# Patient Record
Sex: FEMALE | Race: WHITE
[De-identification: names, ages, dates, MRNs, and addresses within clinical notes are randomized per-mention and may not be internally consistent; named-entity substitution may affect disease eponyms.]

---

## 2018-01-03 ENCOUNTER — HOSPITAL ENCOUNTER (OUTPATIENT)
Dept: HOSPITAL 47 - RADXRMAIN | Age: 33
Discharge: HOME | End: 2018-01-03
Attending: PHYSICIAN ASSISTANT
Payer: COMMERCIAL

## 2018-01-03 DIAGNOSIS — M54.6: Primary | ICD-10-CM

## 2018-01-03 PROCEDURE — 71046 X-RAY EXAM CHEST 2 VIEWS: CPT
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
## 2021-10-02 ENCOUNTER — HOSPITAL ENCOUNTER (EMERGENCY)
Dept: HOSPITAL 47 - EC | Age: 36
Discharge: HOME | End: 2021-10-02
Payer: COMMERCIAL

## 2021-10-02 VITALS — RESPIRATION RATE: 20 BRPM | TEMPERATURE: 98 F

## 2021-10-02 VITALS — HEART RATE: 66 BPM | DIASTOLIC BLOOD PRESSURE: 64 MMHG | SYSTOLIC BLOOD PRESSURE: 94 MMHG

## 2021-10-02 DIAGNOSIS — J40: Primary | ICD-10-CM

## 2021-10-02 DIAGNOSIS — Z79.1: ICD-10-CM

## 2021-10-02 DIAGNOSIS — Z88.0: ICD-10-CM

## 2021-10-02 DIAGNOSIS — Z79.899: ICD-10-CM

## 2021-10-02 DIAGNOSIS — F31.9: ICD-10-CM

## 2021-10-02 DIAGNOSIS — F12.90: ICD-10-CM

## 2021-10-02 DIAGNOSIS — Z87.891: ICD-10-CM

## 2021-10-02 LAB
ALBUMIN SERPL-MCNC: 3.7 G/DL (ref 3.5–5)
ALP SERPL-CCNC: 58 U/L (ref 38–126)
ALT SERPL-CCNC: 9 U/L (ref 4–34)
ANION GAP SERPL CALC-SCNC: 6 MMOL/L
AST SERPL-CCNC: 18 U/L (ref 14–36)
BASOPHILS # BLD AUTO: 0.1 K/UL (ref 0–0.2)
BASOPHILS NFR BLD AUTO: 1 %
BUN SERPL-SCNC: 11 MG/DL (ref 7–17)
CALCIUM SPEC-MCNC: 9.1 MG/DL (ref 8.4–10.2)
CHLORIDE SERPL-SCNC: 103 MMOL/L (ref 98–107)
CO2 SERPL-SCNC: 29 MMOL/L (ref 22–30)
EOSINOPHIL # BLD AUTO: 0.3 K/UL (ref 0–0.7)
EOSINOPHIL NFR BLD AUTO: 3 %
ERYTHROCYTE [DISTWIDTH] IN BLOOD BY AUTOMATED COUNT: 4.37 M/UL (ref 3.8–5.4)
ERYTHROCYTE [DISTWIDTH] IN BLOOD: 12.3 % (ref 11.5–15.5)
GLUCOSE SERPL-MCNC: 76 MG/DL (ref 74–99)
HCT VFR BLD AUTO: 41.2 % (ref 34–46)
HGB BLD-MCNC: 13.5 GM/DL (ref 11.4–16)
LYMPHOCYTES # SPEC AUTO: 1.1 K/UL (ref 1–4.8)
LYMPHOCYTES NFR SPEC AUTO: 12 %
MCH RBC QN AUTO: 30.8 PG (ref 25–35)
MCHC RBC AUTO-ENTMCNC: 32.7 G/DL (ref 31–37)
MCV RBC AUTO: 94.3 FL (ref 80–100)
MONOCYTES # BLD AUTO: 0.6 K/UL (ref 0–1)
MONOCYTES NFR BLD AUTO: 6 %
NEUTROPHILS # BLD AUTO: 6.7 K/UL (ref 1.3–7.7)
NEUTROPHILS NFR BLD AUTO: 77 %
PLATELET # BLD AUTO: 292 K/UL (ref 150–450)
POTASSIUM SERPL-SCNC: 3.9 MMOL/L (ref 3.5–5.1)
PROT SERPL-MCNC: 6.9 G/DL (ref 6.3–8.2)
SODIUM SERPL-SCNC: 138 MMOL/L (ref 137–145)
WBC # BLD AUTO: 8.8 K/UL (ref 3.8–10.6)

## 2021-10-02 PROCEDURE — 96360 HYDRATION IV INFUSION INIT: CPT

## 2021-10-02 PROCEDURE — 99283 EMERGENCY DEPT VISIT LOW MDM: CPT

## 2021-10-02 PROCEDURE — 71046 X-RAY EXAM CHEST 2 VIEWS: CPT

## 2021-10-02 PROCEDURE — 36415 COLL VENOUS BLD VENIPUNCTURE: CPT

## 2021-10-02 PROCEDURE — 87635 SARS-COV-2 COVID-19 AMP PRB: CPT

## 2021-10-02 PROCEDURE — 85025 COMPLETE CBC W/AUTO DIFF WBC: CPT

## 2021-10-02 PROCEDURE — 80053 COMPREHEN METABOLIC PANEL: CPT

## 2021-10-02 NOTE — ED
URI HPI





- General


Chief Complaint: Upper Respiratory Infection


Stated Complaint: revisit - abd pain


Time Seen by Provider: 10/02/21 09:10


Source: patient, RN notes reviewed


Mode of arrival: ambulatory


Limitations: no limitations





- History of Present Illness


Initial Comments: 





Patient is a 36-year-old female presents to emergency room complaining of upper 

respiratory tract symptoms for the past 2 weeks.  She notes that she has a mild 

intermittent cough that does cause mild rib pain.  She notes that she's been 

coughing up a yellow green phlegm/sputum.  She'll that she's been to the medics 

breast several times and tested negative for Covid twice in the past 2 weeks.  

She notes that she does not have a history of pneumonia, asthma.  Patient states

that she rarely gets sick.  She denied any chest pain headache nausea vomiting 

diarrhea constipation fever fatigue chills.





- Related Data


                                Home Medications











 Medication  Instructions  Recorded  Confirmed


 


lamoTRIgine [LaMICtal] 100 mg PO DAILY 04/18/16 04/18/16








                                  Previous Rx's











 Medication  Instructions  Recorded


 


Acetaminophen-Codeine 300-30mg 1 each PO Q6H PRN #10 tablet 04/18/16





[Tylenol #3]  


 


Clindamycin HCl 300 mg PO QID #40 cap 04/18/16


 


Ibuprofen [Motrin] 800 mg PO Q8HR PRN #30 tab 04/18/16


 


Levofloxacin [Levaquin] 500 mg PO DAILY #10 tab 10/02/21











                                    Allergies











Allergy/AdvReac Type Severity Reaction Status Date / Time


 


Penicillins Allergy  Itching Verified 10/02/21 09:09














Review of Systems


ROS Statement: 


Those systems with pertinent positive or pertinent negative responses have been 

documented in the HPI.





ROS Other: All systems not noted in ROS Statement are negative.





Past Medical History


Past Medical History: No Reported History


Additional Past Medical History / Comment(s): anemia


History of Any Multi-Drug Resistant Organisms: None Reported


Past Surgical History: Tubal Ligation


Past Psychological History: Bipolar


Smoking Status: Former smoker


Past Alcohol Use History: None Reported


Past Drug Use History: Marijuana





General Exam


Limitations: no limitations


General appearance: alert, in no apparent distress


Head exam: Present: atraumatic, normocephalic, normal inspection


Eye exam: Present: normal appearance, PERRL, EOMI.  Absent: scleral icterus, 

conjunctival injection, periorbital swelling


ENT exam: Present: normal exam, mucous membranes moist


Neck exam: Present: normal inspection


Respiratory exam: Present: normal lung sounds bilaterally, wheezes (Bilateral 

lower lobes.).  Absent: respiratory distress, rales, rhonchi, stridor


Cardiovascular Exam: Present: regular rate, normal rhythm, normal heart sounds. 

 Absent: systolic murmur, diastolic murmur, rubs, gallop, clicks


Extremities exam: Present: normal inspection, full ROM, normal capillary refill.

  Absent: tenderness, pedal edema, joint swelling, calf tenderness


Neurological exam: Present: alert, oriented X3


Psychiatric exam: Present: normal affect, normal mood


Skin exam: Present: warm, dry, intact, normal color.  Absent: rash





Course


                                   Vital Signs











  10/02/21 10/02/21





  09:06 11:44


 


Temperature 98 F 


 


Pulse Rate 79 66


 


Respiratory 20 20





Rate  


 


Blood Pressure 86/58 94/64


 


O2 Sat by Pulse 99 97





Oximetry  














Medical Decision Making





- Medical Decision Making





36-year-old female complaining of upper respiratory tract symptoms for 2 weeks, 

green phlegm.


Labs, chest x-ray, Covid test, 1 L normal saline ordered.


Labs unremarkable.


Covid test negative.


Chest x-ray shows no acute pulmonary process.


Patient most likely experiencing a bronchitis.


Antibiotics sent to pharmacy.


Case discussed with Dr. Devine, patient discharge home with follow-up primary 

care.





- Lab Data


Result diagrams: 


                                 10/02/21 09:57





                                 10/02/21 09:57


                                   Lab Results











  10/02/21 10/02/21 10/02/21 Range/Units





  09:57 09:57 10:06 


 


WBC  8.8    (3.8-10.6)  k/uL


 


RBC  4.37    (3.80-5.40)  m/uL


 


Hgb  13.5    (11.4-16.0)  gm/dL


 


Hct  41.2    (34.0-46.0)  %


 


MCV  94.3    (80.0-100.0)  fL


 


MCH  30.8    (25.0-35.0)  pg


 


MCHC  32.7    (31.0-37.0)  g/dL


 


RDW  12.3    (11.5-15.5)  %


 


Plt Count  292    (150-450)  k/uL


 


MPV  6.9    


 


Neutrophils %  77    %


 


Lymphocytes %  12    %


 


Monocytes %  6    %


 


Eosinophils %  3    %


 


Basophils %  1    %


 


Neutrophils #  6.7    (1.3-7.7)  k/uL


 


Lymphocytes #  1.1    (1.0-4.8)  k/uL


 


Monocytes #  0.6    (0-1.0)  k/uL


 


Eosinophils #  0.3    (0-0.7)  k/uL


 


Basophils #  0.1    (0-0.2)  k/uL


 


Sodium   138   (137-145)  mmol/L


 


Potassium   3.9   (3.5-5.1)  mmol/L


 


Chloride   103   ()  mmol/L


 


Carbon Dioxide   29   (22-30)  mmol/L


 


Anion Gap   6   mmol/L


 


BUN   11   (7-17)  mg/dL


 


Creatinine   0.62   (0.52-1.04)  mg/dL


 


Est GFR (CKD-EPI)AfAm   >90   (>60 ml/min/1.73 sqM)  


 


Est GFR (CKD-EPI)NonAf   >90   (>60 ml/min/1.73 sqM)  


 


Glucose   76   (74-99)  mg/dL


 


Calcium   9.1   (8.4-10.2)  mg/dL


 


Total Bilirubin   0.5   (0.2-1.3)  mg/dL


 


AST   18   (14-36)  U/L


 


ALT   9   (4-34)  U/L


 


Alkaline Phosphatase   58   ()  U/L


 


Total Protein   6.9   (6.3-8.2)  g/dL


 


Albumin   3.7   (3.5-5.0)  g/dL


 


Coronavirus (PCR)    Not Detected  (Not Detectd)  














Disposition


Clinical Impression: 


 Bronchitis





Disposition: HOME SELF-CARE


Condition: Stable


Instructions (If sedation given, give patient instructions):  Upper Respiratory 

Infection (ED)


Additional Instructions: 


Please return to the Emergency Department if symptoms worsen or any other 

concerns.


Follow-up primary care 1-2 days per


Take antibiotics as prescribed.


Get plenty or rest, increase oral fluids.





Is patient prescribed a controlled substance at d/c from ED?: No


Referrals: 


Eligio Tellez DO [Primary Care Provider] - 1-2 days


Time of Disposition: 12:00

## 2021-10-02 NOTE — XR
EXAMINATION TYPE: XR chest 2V

 

DATE OF EXAM: 10/2/2021

 

COMPARISON: Chest x-ray January 3, 2018

 

HISTORY: Cough for 2 weeks.

 

TECHNIQUE:  Frontal and lateral views of the chest are obtained.

 

FINDINGS: Overlying EKG leads. There is no suspicious focal air space opacity, pleural effusion, or p
neumothorax seen.  The cardiac silhouette size is within normal limits.   The osseous structures are 
intact.

 

IMPRESSION:  No acute pulmonary process.